# Patient Record
Sex: FEMALE | Race: WHITE | URBAN - METROPOLITAN AREA
[De-identification: names, ages, dates, MRNs, and addresses within clinical notes are randomized per-mention and may not be internally consistent; named-entity substitution may affect disease eponyms.]

---

## 2023-08-18 LAB
ABO, EXTERNAL RESULT: NORMAL
HEP B, EXTERNAL RESULT: NEGATIVE
HIV, EXTERNAL RESULT: NEGATIVE
RH FACTOR, EXTERNAL RESULT: POSITIVE
RPR, EXTERNAL RESULT: NON REACTIVE
RUBELLA TITER, EXTERNAL RESULT: NORMAL

## 2024-02-08 LAB — GBS, EXTERNAL RESULT: NEGATIVE

## 2024-02-19 ENCOUNTER — HOSPITAL ENCOUNTER (OUTPATIENT)
Age: 24
Discharge: HOME | End: 2024-02-19
Attending: OBSTETRICS & GYNECOLOGY | Admitting: OBSTETRICS & GYNECOLOGY

## 2024-02-19 VITALS
HEIGHT: 62 IN | BODY MASS INDEX: 20.8 KG/M2 | TEMPERATURE: 98.2 F | RESPIRATION RATE: 16 BRPM | WEIGHT: 113 LBS | DIASTOLIC BLOOD PRESSURE: 58 MMHG | HEART RATE: 75 BPM | SYSTOLIC BLOOD PRESSURE: 104 MMHG

## 2024-02-19 PROBLEM — O47.9 IRREGULAR UTERINE CONTRACTIONS: Status: ACTIVE | Noted: 2024-02-19

## 2024-02-19 PROCEDURE — 99211 OFF/OP EST MAY X REQ PHY/QHP: CPT

## 2024-02-19 RX ORDER — FLUOXETINE HYDROCHLORIDE 20 MG/1
60 CAPSULE ORAL DAILY
COMMUNITY

## 2024-02-20 NOTE — H&P
Department of Obstetrics and Gynecology  Labor and Delivery  Attending Triage Note      SUBJECTIVE:   22 y/o  @  37.2 wks. by FDLMP c/w 10 wk US, final EDC 3/9/24 presents to triage c/o contractions & feeling pressure in back - started around 7 pm. Denies pain. Pt denies LOF or VB. +FM.  Cervix was 1/0/-3 on 24 exam. Last intercourse 9 mos. Ago.     Preg c/b anemia, pending thrombocytopenia, anxiety/depression, h/o shoulder dystocia (G1).     OBJECTIVE    Vitals:    Vitals:    24 2143 24   BP:  (!) 104/58   Pulse:  75   Temp:  98.2 °F (36.8 °C)   Weight: 51.3 kg (113 lb)    Height: 1.575 m (5' 2\")       CONSTITUTIONAL:  awake, alert, cooperative, no apparent distress, and appears stated age  LUNGS:  No increased work of breathing, good air exchange, clear to auscultation bilaterally, no crackles or wheezing  CARDIOVASCULAR:  Normal apical impulse, regular rate and rhythm,  ABDOMEN: Gravid, no scars, normal bowel sounds, soft, non-distended, non-tender, no masses palpated  EXT: No C/C/E    Cervix:             Dilation:  1 cm         Effacement:  80         Station:  -3 cm         Consistency:  soft         Position:  posterior    Fetal Position:  Cephalic    Membranes:  Intact    Fetal heart rate:         Baseline Heart Rate:  125, Cat I, +accels, Mod variability, no decels        Accelerations:  present       Decelerations:  absent       Variability:  moderate    Contraction frequency: +irritability, irreg - q 2-5 min apart          ASSESSMENT & PLAN:      22 y/o  @ 37.2 wks. With irregular contractions  -will monitor for 1-2 hours in triage & if no cervical change plan f/u at sched appt in 3 days. BALJIT/JUANA d/w pt.   - fetus -tracing reassuring

## 2024-02-20 NOTE — DISCHARGE SUMMARY
No cervical change since admission - plan d/c to home  Patient was seen in triage, see H&P for note.

## 2024-02-20 NOTE — PROGRESS NOTES
PT presented to OB triage unit with complaints of ctx. PT ambulated to T4. Oriented to room and telephone. Pt provided urine sample and was placed on EFM. Pt states she is feeling the baby move. Denies any leaking of fluid or vaginal bleeding. Pt started octaviano at 1930. Rates her pain a 4-5 out of 10 during a contraction. PT denied recent sexual intercourse. Denies any additional pain/concerns at this time.

## 2024-02-20 NOTE — PROGRESS NOTES
D/c instructions given. Pt verbalized understanding and denied questions. Pt left OB unit @ 2328 in stable condition, ambulatory and undelivered. Not in active labor.

## 2024-02-29 ENCOUNTER — ANESTHESIA EVENT (OUTPATIENT)
Dept: LABOR AND DELIVERY | Age: 24
End: 2024-02-29
Payer: COMMERCIAL

## 2024-02-29 ENCOUNTER — ANESTHESIA (OUTPATIENT)
Dept: LABOR AND DELIVERY | Age: 24
End: 2024-02-29
Payer: COMMERCIAL

## 2024-02-29 ENCOUNTER — HOSPITAL ENCOUNTER (INPATIENT)
Age: 24
LOS: 2 days | Discharge: HOME | End: 2024-03-02
Attending: OBSTETRICS & GYNECOLOGY | Admitting: OBSTETRICS & GYNECOLOGY
Payer: COMMERCIAL

## 2024-02-29 LAB
ABO + RH BLD: NORMAL
AMPHETAMINES UR QL SCN>1000 NG/ML: NORMAL
ANISOCYTOSIS BLD QL SMEAR: ABNORMAL
BARBITURATES UR QL SCN>200 NG/ML: NORMAL
BASOPHILS # BLD: 0 K/UL (ref 0–0.2)
BASOPHILS NFR BLD: 0 %
BENZODIAZ UR QL SCN>200 NG/ML: NORMAL
BLD GP AB SCN SERPL QL: NORMAL
BUPRENORPHINE+NOR UR QL SCN: NORMAL
CANNABINOIDS UR QL SCN>50 NG/ML: NORMAL
COCAINE UR QL SCN: NORMAL
DEPRECATED RDW RBC AUTO: 13.9 % (ref 12.4–15.4)
DRUG SCREEN COMMENT UR-IMP: NORMAL
EOSINOPHIL # BLD: 0 K/UL (ref 0–0.6)
EOSINOPHIL NFR BLD: 0 %
FENTANYL SCREEN, URINE: NORMAL
FLUAV RNA RESP QL NAA+PROBE: DETECTED
FLUBV RNA RESP QL NAA+PROBE: NOT DETECTED
HCT VFR BLD AUTO: 35 % (ref 36–48)
HGB BLD-MCNC: 11.5 G/DL (ref 12–16)
LYMPHOCYTES # BLD: 0.4 K/UL (ref 1–5.1)
LYMPHOCYTES NFR BLD: 3 %
MACROCYTES BLD QL SMEAR: ABNORMAL
MCH RBC QN AUTO: 30.9 PG (ref 26–34)
MCHC RBC AUTO-ENTMCNC: 33 G/DL (ref 31–36)
MCV RBC AUTO: 93.7 FL (ref 80–100)
METAMYELOCYTES NFR BLD MANUAL: 2 %
METHADONE UR QL SCN>300 NG/ML: NORMAL
MICROCYTES BLD QL SMEAR: ABNORMAL
MONOCYTES # BLD: 0.7 K/UL (ref 0–1.3)
MONOCYTES NFR BLD: 5 %
NEUTROPHILS # BLD: 12.1 K/UL (ref 1.7–7.7)
NEUTROPHILS NFR BLD: 58 %
NEUTS BAND NFR BLD MANUAL: 32 % (ref 0–7)
OPIATES UR QL SCN>300 NG/ML: NORMAL
OVALOCYTES BLD QL SMEAR: ABNORMAL
OXYCODONE UR QL SCN: NORMAL
PCP UR QL SCN>25 NG/ML: NORMAL
PH UR STRIP: 7 [PH]
PLATELET # BLD AUTO: 112 K/UL (ref 135–450)
PLATELET BLD QL SMEAR: ABNORMAL
PMV BLD AUTO: 9.4 FL (ref 5–10.5)
POIKILOCYTOSIS BLD QL SMEAR: ABNORMAL
POLYCHROMASIA BLD QL SMEAR: ABNORMAL
RBC # BLD AUTO: 3.73 M/UL (ref 4–5.2)
REASON FOR REJECTION: NORMAL
REJECTED TEST: NORMAL
SARS-COV-2 RNA RESP QL NAA+PROBE: NOT DETECTED
SLIDE REVIEW: ABNORMAL
SPHEROCYTES BLD QL SMEAR: ABNORMAL
WBC # BLD AUTO: 13.1 K/UL (ref 4–11)

## 2024-02-29 PROCEDURE — 3700000025 EPIDURAL BLOCK: Performed by: ANESTHESIOLOGY

## 2024-02-29 PROCEDURE — 86780 TREPONEMA PALLIDUM: CPT

## 2024-02-29 PROCEDURE — 6360000002 HC RX W HCPCS: Performed by: NURSE ANESTHETIST, CERTIFIED REGISTERED

## 2024-02-29 PROCEDURE — 2580000003 HC RX 258: Performed by: OBSTETRICS & GYNECOLOGY

## 2024-02-29 PROCEDURE — 85025 COMPLETE CBC W/AUTO DIFF WBC: CPT

## 2024-02-29 PROCEDURE — 80307 DRUG TEST PRSMV CHEM ANLYZR: CPT

## 2024-02-29 PROCEDURE — 10907ZC DRAINAGE OF AMNIOTIC FLUID, THERAPEUTIC FROM PRODUCTS OF CONCEPTION, VIA NATURAL OR ARTIFICIAL OPENING: ICD-10-PCS | Performed by: OBSTETRICS & GYNECOLOGY

## 2024-02-29 PROCEDURE — 2500000003 HC RX 250 WO HCPCS: Performed by: NURSE ANESTHETIST, CERTIFIED REGISTERED

## 2024-02-29 PROCEDURE — 86900 BLOOD TYPING SEROLOGIC ABO: CPT

## 2024-02-29 PROCEDURE — 86850 RBC ANTIBODY SCREEN: CPT

## 2024-02-29 PROCEDURE — 87636 SARSCOV2 & INF A&B AMP PRB: CPT

## 2024-02-29 PROCEDURE — 6360000002 HC RX W HCPCS: Performed by: OBSTETRICS & GYNECOLOGY

## 2024-02-29 PROCEDURE — 86901 BLOOD TYPING SEROLOGIC RH(D): CPT

## 2024-02-29 PROCEDURE — 7200000001 HC VAGINAL DELIVERY

## 2024-02-29 PROCEDURE — 6370000000 HC RX 637 (ALT 250 FOR IP)

## 2024-02-29 PROCEDURE — 0UQG7ZZ REPAIR VAGINA, VIA NATURAL OR ARTIFICIAL OPENING: ICD-10-PCS | Performed by: OBSTETRICS & GYNECOLOGY

## 2024-02-29 PROCEDURE — 1220000000 HC SEMI PRIVATE OB R&B

## 2024-02-29 PROCEDURE — 6370000000 HC RX 637 (ALT 250 FOR IP): Performed by: OBSTETRICS & GYNECOLOGY

## 2024-02-29 RX ORDER — OSELTAMIVIR PHOSPHATE 75 MG/1
75 CAPSULE ORAL 2 TIMES DAILY
Status: DISCONTINUED | OUTPATIENT
Start: 2024-02-29 | End: 2024-03-02 | Stop reason: HOSPADM

## 2024-02-29 RX ORDER — FAMOTIDINE 10 MG/ML
20 INJECTION, SOLUTION INTRAVENOUS 2 TIMES DAILY
Status: DISCONTINUED | OUTPATIENT
Start: 2024-02-29 | End: 2024-02-29

## 2024-02-29 RX ORDER — FERROUS SULFATE 325(65) MG
325 TABLET ORAL 2 TIMES DAILY WITH MEALS
Status: DISCONTINUED | OUTPATIENT
Start: 2024-03-01 | End: 2024-03-02 | Stop reason: HOSPADM

## 2024-02-29 RX ORDER — POLYETHYLENE GLYCOL 3350 17 G/17G
17 POWDER, FOR SOLUTION ORAL DAILY
Status: DISCONTINUED | OUTPATIENT
Start: 2024-03-01 | End: 2024-03-02 | Stop reason: HOSPADM

## 2024-02-29 RX ORDER — ONDANSETRON 2 MG/ML
4 INJECTION INTRAMUSCULAR; INTRAVENOUS EVERY 6 HOURS PRN
Status: DISCONTINUED | OUTPATIENT
Start: 2024-02-29 | End: 2024-02-29

## 2024-02-29 RX ORDER — ACETAMINOPHEN 500 MG
TABLET ORAL
Status: COMPLETED
Start: 2024-02-29 | End: 2024-02-29

## 2024-02-29 RX ORDER — ACETAMINOPHEN 500 MG
1000 TABLET ORAL ONCE
Status: COMPLETED | OUTPATIENT
Start: 2024-02-29 | End: 2024-02-29

## 2024-02-29 RX ORDER — CARBOPROST TROMETHAMINE 250 UG/ML
250 INJECTION, SOLUTION INTRAMUSCULAR PRN
Status: DISCONTINUED | OUTPATIENT
Start: 2024-02-29 | End: 2024-02-29

## 2024-02-29 RX ORDER — MISOPROSTOL 100 UG/1
800 TABLET ORAL PRN
Status: DISCONTINUED | OUTPATIENT
Start: 2024-02-29 | End: 2024-02-29

## 2024-02-29 RX ORDER — DOCUSATE SODIUM 100 MG/1
100 CAPSULE, LIQUID FILLED ORAL 2 TIMES DAILY
Status: DISCONTINUED | OUTPATIENT
Start: 2024-02-29 | End: 2024-02-29

## 2024-02-29 RX ORDER — IBUPROFEN 800 MG/1
800 TABLET ORAL EVERY 8 HOURS SCHEDULED
Status: DISCONTINUED | OUTPATIENT
Start: 2024-02-29 | End: 2024-03-02 | Stop reason: HOSPADM

## 2024-02-29 RX ORDER — LANOLIN 100 %
OINTMENT (GRAM) TOPICAL PRN
Status: DISCONTINUED | OUTPATIENT
Start: 2024-02-29 | End: 2024-03-02 | Stop reason: HOSPADM

## 2024-02-29 RX ORDER — SODIUM CHLORIDE, SODIUM LACTATE, POTASSIUM CHLORIDE, AND CALCIUM CHLORIDE .6; .31; .03; .02 G/100ML; G/100ML; G/100ML; G/100ML
500 INJECTION, SOLUTION INTRAVENOUS PRN
Status: DISCONTINUED | OUTPATIENT
Start: 2024-02-29 | End: 2024-02-29

## 2024-02-29 RX ORDER — FLUOXETINE HYDROCHLORIDE 20 MG/1
60 CAPSULE ORAL DAILY
Status: DISCONTINUED | OUTPATIENT
Start: 2024-03-01 | End: 2024-03-02 | Stop reason: HOSPADM

## 2024-02-29 RX ORDER — METHYLERGONOVINE MALEATE 0.2 MG/ML
200 INJECTION INTRAVENOUS PRN
Status: DISCONTINUED | OUTPATIENT
Start: 2024-02-29 | End: 2024-02-29

## 2024-02-29 RX ORDER — SIMETHICONE 80 MG
80 TABLET,CHEWABLE ORAL EVERY 6 HOURS PRN
Status: DISCONTINUED | OUTPATIENT
Start: 2024-02-29 | End: 2024-03-02 | Stop reason: HOSPADM

## 2024-02-29 RX ORDER — DOCUSATE SODIUM 100 MG/1
100 CAPSULE, LIQUID FILLED ORAL 2 TIMES DAILY
Status: DISCONTINUED | OUTPATIENT
Start: 2024-02-29 | End: 2024-03-02 | Stop reason: HOSPADM

## 2024-02-29 RX ORDER — FAMOTIDINE 20 MG/1
20 TABLET, FILM COATED ORAL 2 TIMES DAILY
Status: DISCONTINUED | OUTPATIENT
Start: 2024-02-29 | End: 2024-03-02 | Stop reason: HOSPADM

## 2024-02-29 RX ORDER — SODIUM CHLORIDE, SODIUM LACTATE, POTASSIUM CHLORIDE, CALCIUM CHLORIDE 600; 310; 30; 20 MG/100ML; MG/100ML; MG/100ML; MG/100ML
INJECTION, SOLUTION INTRAVENOUS CONTINUOUS
Status: DISCONTINUED | OUTPATIENT
Start: 2024-02-29 | End: 2024-02-29

## 2024-02-29 RX ORDER — SODIUM CHLORIDE, SODIUM LACTATE, POTASSIUM CHLORIDE, CALCIUM CHLORIDE 600; 310; 30; 20 MG/100ML; MG/100ML; MG/100ML; MG/100ML
INJECTION, SOLUTION INTRAVENOUS CONTINUOUS
Status: ACTIVE | OUTPATIENT
Start: 2024-02-29 | End: 2024-03-01

## 2024-02-29 RX ORDER — ACETAMINOPHEN 500 MG
1000 TABLET ORAL EVERY 8 HOURS
Status: DISCONTINUED | OUTPATIENT
Start: 2024-02-29 | End: 2024-03-02 | Stop reason: HOSPADM

## 2024-02-29 RX ORDER — SODIUM CHLORIDE, SODIUM LACTATE, POTASSIUM CHLORIDE, AND CALCIUM CHLORIDE .6; .31; .03; .02 G/100ML; G/100ML; G/100ML; G/100ML
1000 INJECTION, SOLUTION INTRAVENOUS PRN
Status: DISCONTINUED | OUTPATIENT
Start: 2024-02-29 | End: 2024-02-29

## 2024-02-29 RX ORDER — SODIUM CHLORIDE 0.9 % (FLUSH) 0.9 %
5-40 SYRINGE (ML) INJECTION PRN
Status: DISCONTINUED | OUTPATIENT
Start: 2024-02-29 | End: 2024-03-02 | Stop reason: HOSPADM

## 2024-02-29 RX ORDER — PSEUDOEPHEDRINE HCL 30 MG
60 TABLET ORAL EVERY 6 HOURS PRN
Status: DISCONTINUED | OUTPATIENT
Start: 2024-02-29 | End: 2024-03-02 | Stop reason: HOSPADM

## 2024-02-29 RX ORDER — BUPIVACAINE HYDROCHLORIDE 5 MG/ML
INJECTION, SOLUTION EPIDURAL; INTRACAUDAL PRN
Status: DISCONTINUED | OUTPATIENT
Start: 2024-02-29 | End: 2024-02-29 | Stop reason: SDUPTHER

## 2024-02-29 RX ADMIN — SODIUM CHLORIDE, POTASSIUM CHLORIDE, SODIUM LACTATE AND CALCIUM CHLORIDE 1000 ML: 600; 310; 30; 20 INJECTION, SOLUTION INTRAVENOUS at 15:51

## 2024-02-29 RX ADMIN — Medication 166.7 ML: at 21:16

## 2024-02-29 RX ADMIN — OSELTAMIVIR PHOSPHATE 75 MG: 75 CAPSULE ORAL at 22:39

## 2024-02-29 RX ADMIN — SODIUM CHLORIDE, POTASSIUM CHLORIDE, SODIUM LACTATE AND CALCIUM CHLORIDE 1000 ML: 600; 310; 30; 20 INJECTION, SOLUTION INTRAVENOUS at 16:15

## 2024-02-29 RX ADMIN — DOCUSATE SODIUM 100 MG: 100 CAPSULE, LIQUID FILLED ORAL at 22:39

## 2024-02-29 RX ADMIN — ACETAMINOPHEN 1000 MG: 500 TABLET ORAL at 22:39

## 2024-02-29 RX ADMIN — PSEUDOEPHEDRINE HCL 60 MG: 30 TABLET, FILM COATED ORAL at 15:10

## 2024-02-29 RX ADMIN — BUPIVACAINE HYDROCHLORIDE 4 ML: 5 INJECTION, SOLUTION EPIDURAL; INTRACAUDAL; PERINEURAL at 16:24

## 2024-02-29 RX ADMIN — SALINE NASAL SPRAY 1 SPRAY: 1.5 SOLUTION NASAL at 14:36

## 2024-02-29 RX ADMIN — SODIUM CHLORIDE, POTASSIUM CHLORIDE, SODIUM LACTATE AND CALCIUM CHLORIDE: 600; 310; 30; 20 INJECTION, SOLUTION INTRAVENOUS at 12:15

## 2024-02-29 RX ADMIN — PSEUDOEPHEDRINE HCL 60 MG: 30 TABLET, FILM COATED ORAL at 22:40

## 2024-02-29 RX ADMIN — ACETAMINOPHEN 1000 MG: 500 TABLET ORAL at 13:19

## 2024-02-29 RX ADMIN — Medication 15 ML/HR: at 16:30

## 2024-02-29 RX ADMIN — Medication 87.3 MILLI-UNITS/MIN: at 21:27

## 2024-02-29 RX ADMIN — Medication 1000 MG: at 13:19

## 2024-02-29 NOTE — PROGRESS NOTES
Pt here with c/o ctxns started at approx 0100 and have been 10-15 mins apart. Pain rated 5-6/10 in back. Denies any leaking of fluid and denies any bleeding. States call HS but no one called her back so she came to triage.

## 2024-02-29 NOTE — PROGRESS NOTES
House Officer Brief Labor note:     24 y.o.  @ 38w5d, here for labor.   Pt seen and examined at request of primary OB for potential AROM.   Doing well.   Comfortable w/ epidural.  Denies HA / vision changes, RUQ pain or worsening edema at this time.     FHT:   Cat 1 / Reactive     VE:    /-2  AROM -- clear fluid      IV Pitocin: None     Plan:  Reassuring fetal status   Successful AROM   Plan per primary OB    Please call with questions or concerns   Brenna Villegas, DO

## 2024-02-29 NOTE — ANESTHESIA PROCEDURE NOTES
Epidural Block    Patient location during procedure: OB  Reason for block: labor epidural  Staffing  Resident/CRNA: Bsesy Evans APRN - CRNA  Performed by: Bessy Evans APRN - CRNA  Authorized by: Jeb Boykin MD    Epidural  Patient position: sitting  Prep: ChloraPrep and site prepped and draped  Patient monitoring: continuous pulse ox and frequent blood pressure checks  Approach: midline  Location: L3-4  Injection technique: SHEILA saline  Provider prep: sterile gloves and mask  Needle  Needle type: Tuohy   Needle gauge: 17 G  Needle length: 3.5 in  Needle insertion depth: 4 cm  Catheter type: side hole  Catheter size: 19 G  Catheter at skin depth: 10 cm  Test dose: negativeCatheter Secured: tegaderm  Assessment  Hemodynamics: stable  Attempts: 1  Outcomes: uncomplicated and patient tolerated procedure well  Additional Notes  Pt prepped and draped in sterile fashion.  Skin wheal with 1% lidocaine.  SHEILA with 3 cc NS, 25g spinal needle inserted per elias.  Clear CSF visualized in hub.  Needle withdrawn and catheter threaded.  Negative test dose 3cc 1.5% Lidocaine with Epinephrine.  Sterile dressing applied.   Preanesthetic Checklist  Completed: patient identified, IV checked, site marked, risks and benefits discussed, surgical/procedural consents, equipment checked, pre-op evaluation, timeout performed, anesthesia consent given, oxygen available, monitors applied/VS acknowledged and blood product R/B/A discussed and consented

## 2024-02-29 NOTE — H&P
[facility]  Department of Obstetrics and Gynecology  History and Physical      CHIEF COMPLAINT:  contractions    HISTORY OF PRESENT ILLNESS:      The patient is a 24 y.o. y.o.  at 38w5d by LMP confirmed by 10w5d ultrasound, EDC 3/9/2024 is admitted for labor.  Patient also has sinus congestion symptoms X 2 days with negative COVID/flu test today.  + fetal movement, no leakage of fluid, no vaginal bleeding    Prenatal Problem List includes: anemia improved on iron, chronic lightheadedness suspicious for POTS, anxiety/depression, gestational thrombocytopenia, history of shoulder dystocia, low BMI at beginning of pregnancy    PRENATAL CARE:    Provider:  [facility]    Blood Type/Rh:  O+    Antibody Screen:  Neg  Rubella:  Immune  RPR:  NR  Hepatitis B Surface Antigen: Neg  HIV:  Neg  Gonorrhea:  Neg  Chlamydia:  Neg  1 hour Glucose Tolerance Test:  70  Group B Strep:  negative      REVIEW OF SYSTEMS:    CONSTITUTIONAL:  negative for  fevers and sweats  NEURO: no headache, no vision changes  RESPIRATORY:  negative for dyspnea  CARDIOVASCULAR:  negative for  chest pain  GASTROINTESTINAL:  negative for nausea, vomiting and change in bowel habits, no RUQ pain    PHYSICAL EXAM:  Vitals:    24 0940   BP: (!) 100/55   Pulse: (!) 107   Resp: 16   Temp: 98 °F (36.7 °C)     CONSTITUTIONAL:  awake, alert, cooperative, no apparent distress, and appears stated age  ABDOMEN:  Gravid, non tender  MUSCULOSKELETAL:  Full range of motion  Fetal heart rate: CAT 1, reassuring, 150 bpm, moderate variability, + accels.  REACTIVE NST  Cervix: 4/80/-3, cephalic  Contraction frequency:  q 4 minutes  Membranes:  intact    General Labs:      CBC pending    ASSESSMENT AND PLAN:    24 y.o. y.o.   at 38w5d    Principal Problem:  -in early labor  -Reassuring fetal status    Plan:   1. Admit to L&D  2. Admission labs drawn  3. Patient was counseled on plan of care including risks and expectations.   4. Expectant

## 2024-02-29 NOTE — ANESTHESIA PRE PROCEDURE
Department of Anesthesiology  Preprocedure Note       Name:  Tamia Roberts   Age:  24 y.o.  :  2000                                          MRN:  [id number]         Date:  2024      Surgeon: * No surgeons listed *    Procedure: * No procedures listed *    Medications prior to admission:   Prior to Admission medications    Medication Sig Start Date End Date Taking? Authorizing Provider   FLUoxetine (PROZAC) 20 MG capsule Take 3 capsules by mouth daily    ProviderDaxa MD   Prenatal MV-Min-Fe Fum-FA-DHA (PRENATAL 1 PO) Take 1 tablet by mouth daily    ProviderDaxa MD       Current medications:    Current Facility-Administered Medications   Medication Dose Route Frequency Provider Last Rate Last Admin    lactated ringers IV soln infusion   IntraVENous Continuous Francheska Rendon  mL/hr at 24 1215 New Bag at 24 1215    lactated ringers bolus 500 mL  500 mL IntraVENous PRN Francheska Rendon MD        Or    lactated ringers bolus 1,000 mL  1,000 mL IntraVENous PRN Francheska Rendon .9 mL/hr at 24 1551 1,000 mL at 24 1551    methylergonovine (METHERGINE) injection 200 mcg  200 mcg IntraMUSCular PRN Francheska Rendon MD        carboprost (HEMABATE) injection 250 mcg  250 mcg IntraMUSCular PRN Francheska Rendon MD        miSOPROStol (CYTOTEC) tablet 800 mcg  800 mcg Rectal PRN Francheska Rendon MD        tranexamic acid (CYKLOKAPRON) 1,000 mg in sodium chloride 0.9 % 100 mL IVPB  1,000 mg IntraVENous Once PRN Francheska Rendon MD        oxytocin (PITOCIN) 30 units in 500 mL infusion  87.3 alyssa-units/min IntraVENous Continuous PRN Francheska Rendon MD        And    oxytocin (PITOCIN) 10 unit bolus from the bag  10 Units IntraVENous PRN Francheska Rendon MD        famotidine (PEPCID) injection 20 mg  20 mg IntraVENous BID Francheska Rendon MD        ondansetron (ZOFRAN) injection 4 mg  4 mg IntraVENous Q6H PRN Francheska Rendon MD        docusate sodium (COLACE) capsule 100 mg  100 mg Oral BID

## 2024-03-01 PROBLEM — F41.9 ANXIETY AND DEPRESSION: Status: ACTIVE | Noted: 2024-03-01

## 2024-03-01 PROBLEM — J11.1 FLU: Status: ACTIVE | Noted: 2024-03-01

## 2024-03-01 PROBLEM — G90.A POTS (POSTURAL ORTHOSTATIC TACHYCARDIA SYNDROME): Status: ACTIVE | Noted: 2024-03-01

## 2024-03-01 PROBLEM — F32.A ANXIETY AND DEPRESSION: Status: ACTIVE | Noted: 2024-03-01

## 2024-03-01 LAB
DEPRECATED RDW RBC AUTO: 13.9 % (ref 12.4–15.4)
HCT VFR BLD AUTO: 31.5 % (ref 36–48)
HGB BLD-MCNC: 10.6 G/DL (ref 12–16)
MCH RBC QN AUTO: 31.6 PG (ref 26–34)
MCHC RBC AUTO-ENTMCNC: 33.7 G/DL (ref 31–36)
MCV RBC AUTO: 93.9 FL (ref 80–100)
PLATELET # BLD AUTO: 98 K/UL (ref 135–450)
PLATELET BLD QL SMEAR: ABNORMAL
PMV BLD AUTO: 9.2 FL (ref 5–10.5)
RBC # BLD AUTO: 3.35 M/UL (ref 4–5.2)
REAGIN+T PALLIDUM IGG+IGM SERPL-IMP: NORMAL
SLIDE REVIEW: ABNORMAL
WBC # BLD AUTO: 7.8 K/UL (ref 4–11)

## 2024-03-01 PROCEDURE — 1220000000 HC SEMI PRIVATE OB R&B

## 2024-03-01 PROCEDURE — 85027 COMPLETE CBC AUTOMATED: CPT

## 2024-03-01 PROCEDURE — 36415 COLL VENOUS BLD VENIPUNCTURE: CPT

## 2024-03-01 PROCEDURE — 6370000000 HC RX 637 (ALT 250 FOR IP): Performed by: OBSTETRICS & GYNECOLOGY

## 2024-03-01 PROCEDURE — 2580000003 HC RX 258: Performed by: OBSTETRICS & GYNECOLOGY

## 2024-03-01 RX ORDER — GUAIFENESIN 600 MG/1
600 TABLET, EXTENDED RELEASE ORAL 2 TIMES DAILY
Status: DISCONTINUED | OUTPATIENT
Start: 2024-03-01 | End: 2024-03-02 | Stop reason: HOSPADM

## 2024-03-01 RX ADMIN — DOCUSATE SODIUM 100 MG: 100 CAPSULE, LIQUID FILLED ORAL at 08:34

## 2024-03-01 RX ADMIN — POLYETHYLENE GLYCOL 3350 17 G: 17 POWDER, FOR SOLUTION ORAL at 08:33

## 2024-03-01 RX ADMIN — FAMOTIDINE 20 MG: 20 TABLET, FILM COATED ORAL at 08:34

## 2024-03-01 RX ADMIN — GUAIFENESIN 600 MG: 600 TABLET ORAL at 11:46

## 2024-03-01 RX ADMIN — IBUPROFEN 800 MG: 800 TABLET, FILM COATED ORAL at 16:02

## 2024-03-01 RX ADMIN — GUAIFENESIN 600 MG: 600 TABLET ORAL at 21:20

## 2024-03-01 RX ADMIN — OSELTAMIVIR PHOSPHATE 75 MG: 75 CAPSULE ORAL at 08:34

## 2024-03-01 RX ADMIN — IBUPROFEN 800 MG: 800 TABLET, FILM COATED ORAL at 06:45

## 2024-03-01 RX ADMIN — Medication 1 LOZENGE: at 11:47

## 2024-03-01 RX ADMIN — FLUOXETINE 60 MG: 20 CAPSULE ORAL at 08:34

## 2024-03-01 RX ADMIN — DOCUSATE SODIUM 100 MG: 100 CAPSULE, LIQUID FILLED ORAL at 21:20

## 2024-03-01 RX ADMIN — OSELTAMIVIR PHOSPHATE 75 MG: 75 CAPSULE ORAL at 21:20

## 2024-03-01 RX ADMIN — ACETAMINOPHEN 1000 MG: 500 TABLET ORAL at 08:34

## 2024-03-01 RX ADMIN — Medication 10 ML: at 21:20

## 2024-03-01 RX ADMIN — ACETAMINOPHEN 1000 MG: 500 TABLET ORAL at 21:21

## 2024-03-01 ASSESSMENT — PAIN DESCRIPTION - LOCATION
LOCATION: BACK;ABDOMEN
LOCATION: PERINEUM
LOCATION: ABDOMEN
LOCATION: ABDOMEN

## 2024-03-01 ASSESSMENT — PAIN - FUNCTIONAL ASSESSMENT
PAIN_FUNCTIONAL_ASSESSMENT: ACTIVITIES ARE NOT PREVENTED

## 2024-03-01 ASSESSMENT — PAIN SCALES - GENERAL
PAINLEVEL_OUTOF10: 2
PAINLEVEL_OUTOF10: 2
PAINLEVEL_OUTOF10: 3
PAINLEVEL_OUTOF10: 3
PAINLEVEL_OUTOF10: 0
PAINLEVEL_OUTOF10: 3

## 2024-03-01 ASSESSMENT — PAIN DESCRIPTION - ORIENTATION
ORIENTATION: LOWER
ORIENTATION: LOWER

## 2024-03-01 ASSESSMENT — PAIN DESCRIPTION - DESCRIPTORS
DESCRIPTORS: CRAMPING
DESCRIPTORS: DISCOMFORT

## 2024-03-01 NOTE — ANESTHESIA POSTPROCEDURE EVALUATION
Department of Anesthesiology  Postprocedure Note    Patient: Tamia Roberts  MRN: [id number]  YOB: 2000  Date of evaluation: 3/1/2024    Procedure Summary       Date: 02/29/24 Room / Location:     Anesthesia Start: 1612 Anesthesia Stop: 2111    Procedure: Labor Analgesia Diagnosis:     Scheduled Providers:  Responsible Provider: Jeb Boykin MD    Anesthesia Type: epidural ASA Status: 2 - Emergent            Anesthesia Type: No value filed.    Glen Phase I:      Glen Phase II: Glen Score: 9    Anesthesia Post Evaluation    Nausea & Vomiting: no nausea and no vomiting  Cardiovascular status: hemodynamically stable  Hydration status: stable  Comments: Pt. In droplet isolation for flu. Notes and flowsheet reviewed. No apparent anesthesia related complications  Pain management: adequate and satisfactory to patient        No notable events documented.

## 2024-03-01 NOTE — PROGRESS NOTES
OBGYN Attending Progress Note  PPD#1 s/p   QBL 309mL    S: Reports cough and nasal congestion in setting of acute flu.  No postpartum related complaints, tolerating po intake, pain controlled by meds, + ambulation, lochia decresing similar to menses, voiding without difficulty.     O: BP (!) 92/51   Pulse 99   Temp 97.4 °F (36.3 °C) (Oral)   Resp 16   Ht 1.575 m (5' 2\")   Wt 50.3 kg (111 lb)   SpO2 98%   Breastfeeding Unknown   BMI 20.30 kg/m²   Abd - soft, fundus firm below umbilicus  Ext warm well perfused    WBC/Hgb/Hct/Plts:  13.1/11.5/35.0/112 ( 1215)    A/P: PPD #1   1. Recovering well  2. Meeting postpartum milestones  3. Cont routine pp care  4. Mucinex and lozenges ordered for symptoms. Encouraged to drink plenty of water.  5. Male infant- desires elective circumcision - plan once infant cleared per peds.  6. Anticipate discharge home day 2-3    DO SABIHA Pisano

## 2024-03-01 NOTE — FLOWSHEET NOTE
Pt actively pushing.  RN remains in continuous attendance at the bedside.  Assessment and evaluation of fetal heart rate ongoing via continuous EFM.

## 2024-03-01 NOTE — L&D DELIVERY NOTE
Department of Obstetrics and Gynecology  Spontaneous Vaginal Delivery Note     Anesthesia:  epidural anesthesia    Delivery Summary: 23yo  @ 38w5d presented to L&D in labor and progressed spontaneously and after AROM.  Pushed for 10 minutes.   I was called to room for delivery.  Head delivered OA over intact perineum.  Nuchal cord manually reduced.  Shoulders and body delivered easily and spontaneously crying infant was placed on mother's abdomen.  After approximately 1 minute, the cord was double clamped.  The baby's father cut the cord.  Cord blood was obtained.  Placenta delivered intact with gentle traction on cord.  Fundus was massaged and found to be firm.  A left introitus superficial laceration was repaired with 4-0 Vicryl in single stitch.  Vagina was cleared of remaining blood and clots.  There were no foreign bodies.  Mother and baby boy tolerated delivery well.  EBL 50 ml.    Condition:  infant stable to general nursery and mother stable      MD Armando Barrientos Gene Cantrell [[id number]]      Labor Events     Labor: No   Steroids: None  Cervical Ripening Date/Time:      Antibiotics Received during Labor: No  Rupture Date/Time:  24 18:05:00   Rupture Type: AROM  Fluid Color: Clear, Bloody Show  Fluid Odor: None  Fluid Volume: Moderate  Augmentation: AROM  Labor Complications: Cord around body              Anesthesia    Method: Epidural       Labor Event Times      Labor onset date/time:  24 12:00:00     Dilation complete date/time:  24 20:54:00 EST     Start pushing date/time:  2024 21:00:00   Decision date/time (emergent ):            Delivery Details      Delivery Date: 24 Delivery Time: 21:11:00   Delivery Type: Vaginal, Spontaneous               Presentation    Presentation: Vertex       Shoulder Dystocia    Shoulder Dystocia Present?: No       Assisted Delivery Details    Forceps Attempted?: No  Vacuum Extractor Attempted?: No

## 2024-03-01 NOTE — PROGRESS NOTES
Pt assisted by 2 staff members to restroom for first time get up. Pt denies dizziness or lightheadedness. Gait steady. Pt voided 450 mL urine without difficulty. Pericare done by pt with RN instruction. New ice pack, pad and panties put on pt. Gown changed. Hand hygiene done. Complete linen change done and comfort pad put on bed. Pt tolerated well.

## 2024-03-01 NOTE — PROGRESS NOTES
Writer in to assess baby under warmer. Writer inquired about last feeding with MOB. MOB states last feeding was at 11:30pm. Writer educated MOB on importance of feeding baby q2-3 hours while breast feeding. Baby sleepy and uninterested in feeding. MOB attempted to express but was unable to get any milk out. MOB states ok to give bottle supplement this time. Nurse fed baby bottle. Baby sleepy and uncoordinated with feed. 10mls given at this time.

## 2024-03-02 VITALS
SYSTOLIC BLOOD PRESSURE: 89 MMHG | BODY MASS INDEX: 20.43 KG/M2 | TEMPERATURE: 97.6 F | HEIGHT: 62 IN | WEIGHT: 111 LBS | DIASTOLIC BLOOD PRESSURE: 59 MMHG | OXYGEN SATURATION: 99 % | RESPIRATION RATE: 16 BRPM | HEART RATE: 88 BPM

## 2024-03-02 PROBLEM — O99.119 GESTATIONAL THROMBOCYTOPENIA (HCC): Status: ACTIVE | Noted: 2024-03-02

## 2024-03-02 PROBLEM — D69.6 GESTATIONAL THROMBOCYTOPENIA (HCC): Status: ACTIVE | Noted: 2024-03-02

## 2024-03-02 PROBLEM — O47.9 IRREGULAR UTERINE CONTRACTIONS: Status: RESOLVED | Noted: 2024-02-19 | Resolved: 2024-03-02

## 2024-03-02 PROCEDURE — 6370000000 HC RX 637 (ALT 250 FOR IP): Performed by: OBSTETRICS & GYNECOLOGY

## 2024-03-02 PROCEDURE — 2580000003 HC RX 258: Performed by: OBSTETRICS & GYNECOLOGY

## 2024-03-02 RX ORDER — PSEUDOEPHEDRINE HYDROCHLORIDE 60 MG/1
60 TABLET, FILM COATED ORAL EVERY 6 HOURS PRN
Qty: 20 TABLET | Refills: 0 | Status: SHIPPED | OUTPATIENT
Start: 2024-03-02 | End: 2024-03-09

## 2024-03-02 RX ORDER — ACETAMINOPHEN 500 MG
500 TABLET ORAL 4 TIMES DAILY PRN
Qty: 360 TABLET | Refills: 1 | Status: SHIPPED | OUTPATIENT
Start: 2024-03-02

## 2024-03-02 RX ORDER — IBUPROFEN 800 MG/1
800 TABLET ORAL EVERY 8 HOURS SCHEDULED
Qty: 120 TABLET | Refills: 3 | Status: SHIPPED | OUTPATIENT
Start: 2024-03-02

## 2024-03-02 RX ADMIN — IBUPROFEN 800 MG: 800 TABLET, FILM COATED ORAL at 09:15

## 2024-03-02 RX ADMIN — OSELTAMIVIR PHOSPHATE 75 MG: 75 CAPSULE ORAL at 09:49

## 2024-03-02 RX ADMIN — IBUPROFEN 800 MG: 800 TABLET, FILM COATED ORAL at 01:24

## 2024-03-02 RX ADMIN — ACETAMINOPHEN 1000 MG: 500 TABLET ORAL at 05:22

## 2024-03-02 RX ADMIN — FLUOXETINE 60 MG: 20 CAPSULE ORAL at 09:16

## 2024-03-02 RX ADMIN — GUAIFENESIN 600 MG: 600 TABLET ORAL at 09:16

## 2024-03-02 RX ADMIN — Medication 10 ML: at 09:16

## 2024-03-02 RX ADMIN — DOCUSATE SODIUM 100 MG: 100 CAPSULE, LIQUID FILLED ORAL at 09:15

## 2024-03-02 RX ADMIN — ACETAMINOPHEN 1000 MG: 500 TABLET ORAL at 14:07

## 2024-03-02 ASSESSMENT — PAIN SCALES - GENERAL
PAINLEVEL_OUTOF10: 1
PAINLEVEL_OUTOF10: 1
PAINLEVEL_OUTOF10: 2
PAINLEVEL_OUTOF10: 1

## 2024-03-02 ASSESSMENT — PAIN - FUNCTIONAL ASSESSMENT
PAIN_FUNCTIONAL_ASSESSMENT: ACTIVITIES ARE NOT PREVENTED
PAIN_FUNCTIONAL_ASSESSMENT: ACTIVITIES ARE NOT PREVENTED

## 2024-03-02 ASSESSMENT — PAIN DESCRIPTION - LOCATION
LOCATION: ABDOMEN
LOCATION: BACK
LOCATION: BACK
LOCATION: BACK;ABDOMEN

## 2024-03-02 ASSESSMENT — PAIN DESCRIPTION - DESCRIPTORS
DESCRIPTORS: DISCOMFORT;SORE
DESCRIPTORS: DULL
DESCRIPTORS: ACHING
DESCRIPTORS: DISCOMFORT;CRAMPING

## 2024-03-02 ASSESSMENT — PAIN DESCRIPTION - ORIENTATION
ORIENTATION: LOWER
ORIENTATION: LOWER

## 2024-03-02 NOTE — PROGRESS NOTES
Notified Dr Lucio about patient's PPD score of 14. Dr Lucio wants pt to follow up with her OB early next week.

## 2024-03-02 NOTE — LACTATION NOTE
This note was copied from a baby's chart.  Lactation Progress Note      Data:   Initial consult during lactation rounds with multip breast feeder, 1 pp. Mother reports baby continues latching comfortably and breastfeeding well so far. Infant with good output per mother.       Action: Introduced self as LC on for the day and offered support. Breast feeding guide booklet provided and reviewed with parents educating on breast care, how milk production works and the types of milk mother will produce, educated on size of infant stomach size, what hunger cues look like vs signs of satiety, what to expect with  feeding behaviors during the first 24-48 hours and reassuring signs that baby is getting enough from the breast including signs of good hydration, meeting daily output goals, feeding goals, and expected weight trends. Encouraged to offer the breast when baby first begins to wake and show early hunger cues, and every 2-3 hours if baby is sleepy and without feeding cues. Reassured of normalcy of sleepy behavior on the first DOL as baby recovers from birth. Gave tips for waking sleepy baby as needed, and encouraged much STS and hand expression of colostrum for infant with attempts to offer the breast. Reviewed what to expect with cluster feeding behaviors on the second DOL and the important role they play on milk supply. Educated on the importance of JOE to promote optimal milk transfer, and prevent soreness to nipples. Explained how a good latch should look and feel vs a shallow latch. Explained that latching should be comfortable without pinching or pain. Educated on how to break the suction of the latch using finger in the corner of infant's mouth and attempt to relatch more deeply if the latch is ever shallow or causing discomfort. Educated on the AAP and WHO breast feeding recommendations for exclusivity and duration. Encouraged to wait until mature milk supply and latching are well established, usually

## 2024-03-02 NOTE — DISCHARGE INSTRUCTIONS
milk.  Avoid stimulation to your breasts. When showering, allow the water to strike only your back.  Wear a good fitting bra, such as a sports bra, until your milk dries up    OTHER REASONS TO CALL YOUR DOCTOR    Your abdomen is tender to the touch or you have pain that cannot be relieved.  Flu-like symptoms such as achy muscles and joints or you are experiencing extreme weakness or dizziness.  Persistent burning or increased urgency in urination.      LITERATURE PROVIDED    For Moms and Those Who Care About Them  Your 's Healthy Start, Grow Smart  Breastfeeding Best for Baby, Best for Mom.  [facility] Parent Information About Universal Hearing Screening  Controlling pain.    I have received the educational material listed above.  The  Channel has provided me with the opportunity to view instructional videos pertaining to infant care and the care of myself.    I verify that I have received the above information and have no further questions and feel confident to care for myself and my baby.    For more information about postpartum care, baby care and feeding, create a [facility] My Chart account, sign in and search using the magnifying glass, typing in postpartum, breast feeding or formula feeding.  [email or web address]

## 2024-03-02 NOTE — DISCHARGE SUMMARY
Obstetric Discharge Summary    Admitting Diagnosis  IUP 38.5 weeks in labor with the flu  OB History          3    Para   3    Term   3            AB        Living   3         SAB        IAB        Ectopic        Molar        Multiple   0    Live Births   3                Reasons for Admission on 2024  9:28 AM  Indication for care in labor and delivery, antepartum [O75.9]  No comment available  Onset of Labor    Prenatal Procedures  None    Intrapartum Procedures        Multiple birth?: No        Spontaneous Vaginal Delivery: See Labor and Delivery Summary       Postpartum Procedures  None    Postpartum/Operative Complications        Data  Information for the patient's :  Gene Roberts [[id number]]   male   Birth Weight: 3.028 kg (6 lb 10.8 oz)   Discharge With Mother  Complications: No    Discharge Diagnosis  PPD#2 s/p , gestational thrombocytopenia, hypotension, POTS, anemia, anxiety/depression  DISCHARGE CONDITION: Stable     Discharge Information     Medication List        START taking these medications      acetaminophen 500 MG tablet  Commonly known as: TYLENOL  Take 1 tablet by mouth 4 times daily as needed for Pain     ibuprofen 800 MG tablet  Commonly known as: ADVIL;MOTRIN  Take 1 tablet by mouth every 8 hours     pseudoephedrine 60 MG tablet  Commonly known as: SUDAFED  Take 1 tablet by mouth every 6 hours as needed for Congestion            CONTINUE taking these medications      FLUoxetine 20 MG capsule  Commonly known as: PROZAC     PRENATAL 1 PO               Where to Get Your Medications        These medications [facility] to [facility] - [place], [place] - [address] [place] [address] - P [phone] - F [phone]  [address] [place] [id number]      Phone: [phone]   acetaminophen 500 MG tablet  ibuprofen 800 MG tablet  pseudoephedrine 60 MG tablet            No discharge procedures on file.    Discharge to: Home  Follow up in 4 weeks at

## 2024-03-02 NOTE — PROGRESS NOTES
Discharge Phone Call    Patient Name: Tamia Roberts     OB Care Provider: Francheska Rendon MD Discharge Date: 3/2/2024    Disposition of baby:    Phone Number: [phone] (home)     Attempts to Contact:  Date:    Caller  Date:    Caller  Date:    Caller    Information for the patient's :  Gene Roberts [[id number]]   Delivery Method: Vaginal, Spontaneous     1.  Now that you are at home is your pain being well controlled?   Y/N   If no, instruct to call       provider.      2. Are you breastfeeding?    Y/N    Do you need any extra support from our lactation staff?      Y/N    If yes, provide number for lactation.  3. Have you made or already had your first appointment with the baby's doctor? Y/N   If no, do      you know when to schedule it?  Y/N    4. Have you scheduled your follow-up appointment?  Y/N  If no, do you know when to schedule       it?    Y/N   If no, they can find it on printed discharge instructions.  5. Did staff discuss safe sleep during your stay? Y/N   6. Did we explain things in a way you could understand?  Y/N  7. Were we respectful of your preferences for labor and birth and include you in the plan of       care?  Y/N  If no, please explain _______________________________________________  8. Is there anyone in particular you would like to mention who provided care for you? _______      _________________________________________________________________________     9. Were you given a Post-Birth Warning Signs handout?  Y/N  Do you have it somewhere      easily accessible?  Y/N  If no, please send them a copy and ask them to put it somewhere      easily found.  10. Have you been crying excessively, having anger or mood swings that feel out of control, or       feel like you can't cope with caring for yourself or baby? Y/N   If yes, they may be showing       signs of postpartum depression and should call provider. There is also a        depression test on page C5 in their discharge

## 2024-03-02 NOTE — PROGRESS NOTES
Postpartum and infant care teaching completed and forms signed by patient. Copy witnessed by RN and given to patient. Patient verbalized understanding of all teaching points. Prescriptions given if applicable. Patient plans to follow-up with OB Provider as instructed. Patient verbalizes understanding of discharge instructions and denies further questions.  ID bands checked. Mother's ID band and one of baby's ID bands removed and taped to discharge instruction sheet, signed by patient and witnessed by RN. Patient discharged in stable condition accompanied by family/guardian. Discharged in wheelchair, holding baby in arms.